# Patient Record
Sex: FEMALE | URBAN - METROPOLITAN AREA
[De-identification: names, ages, dates, MRNs, and addresses within clinical notes are randomized per-mention and may not be internally consistent; named-entity substitution may affect disease eponyms.]

---

## 2024-07-12 ENCOUNTER — TELEPHONE (OUTPATIENT)
Dept: PRIMARY CARE CLINIC | Facility: CLINIC | Age: 44
End: 2024-07-12

## 2024-07-12 NOTE — TELEPHONE ENCOUNTER
----- Message from Krista Cruz LPN sent at 7/10/2024  4:04 PM EDT -----  Regarding: FW: [facility] Appointment Request    ----- Message -----  From: Anila Gonzalez  Sent: 7/10/2024   3:20 PM EDT  To: #  Subject: [facility] Appointment Request                          [facility] Appointment Request    Patient needs appointment for [facility] Appointment Type: New Patient.    Patient Requested Dates(s):Friday  Patient Requested Time:Morning  Provider Name:Dr.Aysha Samano    Reason for Appointment Request: New Patient - Requested Provider unavailable  --------------------------------------------------------------------------------------------------------------------------    Relationship to Patient: Self     Call Back Information: OK to leave message on voicemail  Preferred Call Back Number: Phone [phone]

## 2024-07-12 NOTE — TELEPHONE ENCOUNTER
Informed patient that [facility] is not accepting new patients, but we do have other providers. Patient prefers [facility], said they will wait to see if she eventually opens her schedule again. Patient currently has a PCP elsewhere.